# Patient Record
Sex: MALE | NOT HISPANIC OR LATINO | URBAN - METROPOLITAN AREA
[De-identification: names, ages, dates, MRNs, and addresses within clinical notes are randomized per-mention and may not be internally consistent; named-entity substitution may affect disease eponyms.]

---

## 2017-03-08 ENCOUNTER — OFFICE VISIT - HEALTHEAST (OUTPATIENT)
Dept: CARDIOLOGY | Facility: CLINIC | Age: 57
End: 2017-03-08

## 2017-03-08 DIAGNOSIS — I25.2 OLD INFERIOR WALL MYOCARDIAL INFARCTION: ICD-10-CM

## 2017-03-08 DIAGNOSIS — E78.5 DYSLIPIDEMIA, GOAL LDL BELOW 70: ICD-10-CM

## 2017-03-08 RX ORDER — ALBUTEROL SULFATE 90 UG/1
AEROSOL, METERED RESPIRATORY (INHALATION)
Status: SHIPPED | COMMUNITY
Start: 2017-01-07

## 2017-03-08 RX ORDER — AMLODIPINE BESYLATE 10 MG/1
10 TABLET ORAL
Status: SHIPPED | COMMUNITY
Start: 2016-12-20

## 2017-03-08 ASSESSMENT — MIFFLIN-ST. JEOR: SCORE: 1514.79

## 2017-03-17 ENCOUNTER — HOSPITAL ENCOUNTER (OUTPATIENT)
Dept: CARDIOLOGY | Facility: CLINIC | Age: 57
Discharge: HOME | End: 2017-03-17
Attending: INTERNAL MEDICINE

## 2017-03-17 DIAGNOSIS — I25.2 OLD INFERIOR WALL MYOCARDIAL INFARCTION: ICD-10-CM

## 2017-03-17 LAB
AORTIC ROOT: 3.3 CM
AORTIC VALVE MEAN VELOCITY: 114 CM/S
AV DIMENSIONLESS INDEX VTI: 0.7
AV MEAN GRADIENT: 6 MMHG
AV PEAK GRADIENT: 12.8 MMHG
AV VALVE AREA: 3.1 CM2
AV VELOCITY RATIO: 0.6
BSA FOR ECHO PROCEDURE: 1.86 M2
DOP CALC AO PEAK VEL: 179 CM/S
DOP CALC AO VTI: 38 CM
DOP CALC LVOT AREA: 4.15 CM2
DOP CALC LVOT DIAMETER: 2.3 CM
DOP CALC LVOT PEAK VEL: 112 CM/S
DOP CALC LVOT STROKE VOLUME: 116.7 CM3
DOP CALCLVOT PEAK VEL VTI: 28.1 CM
EJECTION FRACTION: 64 % (ref 55–75)
FRACTIONAL SHORTENING: 29.8 % (ref 28–44)
INTERVENTRICULAR SEPTUM IN END DIASTOLE: 1.5 CM (ref 0.6–1)
IVS/PW RATIO: 1.2
LA AREA 1: 18.8 CM2
LA AREA 2: 17.5 CM2
LEFT ATRIUM LENGTH: 4.98 CM
LEFT ATRIUM SIZE: 3.3 CM
LEFT ATRIUM VOLUME INDEX: 30.2 ML/M2
LEFT ATRIUM VOLUME: 56.2 CM3
LEFT VENTRICLE DIASTOLIC VOLUME INDEX: 65.1 CM3/M2 (ref 34–74)
LEFT VENTRICLE DIASTOLIC VOLUME: 121 CM3 (ref 62–150)
LEFT VENTRICLE MASS INDEX: 142.6 G/M2
LEFT VENTRICLE SYSTOLIC VOLUME INDEX: 23.1 CM3/M2 (ref 11–31)
LEFT VENTRICLE SYSTOLIC VOLUME: 43 CM3 (ref 21–61)
LEFT VENTRICULAR INTERNAL DIMENSION IN DIASTOLE: 4.7 CM (ref 4.2–5.8)
LEFT VENTRICULAR INTERNAL DIMENSION IN SYSTOLE: 3.3 CM (ref 2.5–4)
LEFT VENTRICULAR MASS: 265.2 G
LEFT VENTRICULAR OUTFLOW TRACT MEAN GRADIENT: 3 MMHG
LEFT VENTRICULAR OUTFLOW TRACT MEAN VELOCITY: 71.1 CM/S
LEFT VENTRICULAR OUTFLOW TRACT PEAK GRADIENT: 5 MMHG
LEFT VENTRICULAR POSTERIOR WALL IN END DIASTOLE: 1.3 CM (ref 0.6–1)
LV STROKE VOLUME INDEX: 62.7 ML/M2
MITRAL VALVE E/A RATIO: 1.2
MV AVERAGE E/E' RATIO: 16.4 CM/S
MV DECELERATION TIME: 204 MS
MV E'TISSUE VEL-LAT: 6.92 CM/S
MV E'TISSUE VEL-MED: 5.85 CM/S
MV LATERAL E/E' RATIO: 15.2
MV MEDIAL E/E' RATIO: 17.9
MV PEAK A VELOCITY: 89.5 CM/S
MV PEAK E VELOCITY: 105 CM/S
PR MAX PG: 4 MMHG
PR PEAK VELOCITY: 97.7 CM/S
TRICUSPID REGURGITATION PEAK PRESSURE GRADIENT: 23.4 MMHG
TRICUSPID VALVE PEAK REGURGITANT VELOCITY: 242 CM/S

## 2019-02-12 ENCOUNTER — COMMUNICATION - HEALTHEAST (OUTPATIENT)
Dept: ADMINISTRATIVE | Facility: CLINIC | Age: 59
End: 2019-02-12

## 2019-02-12 ENCOUNTER — AMBULATORY - HEALTHEAST (OUTPATIENT)
Dept: CARDIOLOGY | Facility: CLINIC | Age: 59
End: 2019-02-12

## 2019-02-12 DIAGNOSIS — I25.2 OLD INFERIOR WALL MYOCARDIAL INFARCTION: ICD-10-CM

## 2019-02-12 DIAGNOSIS — I25.10 CORONARY ATHEROSCLEROSIS: ICD-10-CM

## 2019-02-12 DIAGNOSIS — E78.5 DYSLIPIDEMIA, GOAL LDL BELOW 70: ICD-10-CM

## 2021-05-25 ENCOUNTER — RECORDS - HEALTHEAST (OUTPATIENT)
Dept: ADMINISTRATIVE | Facility: CLINIC | Age: 61
End: 2021-05-25

## 2021-05-27 ENCOUNTER — RECORDS - HEALTHEAST (OUTPATIENT)
Dept: ADMINISTRATIVE | Facility: CLINIC | Age: 61
End: 2021-05-27

## 2021-05-28 ENCOUNTER — RECORDS - HEALTHEAST (OUTPATIENT)
Dept: ADMINISTRATIVE | Facility: CLINIC | Age: 61
End: 2021-05-28

## 2021-05-29 ENCOUNTER — RECORDS - HEALTHEAST (OUTPATIENT)
Dept: ADMINISTRATIVE | Facility: CLINIC | Age: 61
End: 2021-05-29

## 2021-05-30 VITALS — BODY MASS INDEX: 24.4 KG/M2 | WEIGHT: 161 LBS | HEIGHT: 68 IN

## 2021-06-01 ENCOUNTER — RECORDS - HEALTHEAST (OUTPATIENT)
Dept: ADMINISTRATIVE | Facility: CLINIC | Age: 61
End: 2021-06-01

## 2021-06-09 ENCOUNTER — RECORDS - HEALTHEAST (OUTPATIENT)
Dept: ADMINISTRATIVE | Facility: CLINIC | Age: 61
End: 2021-06-09

## 2021-06-18 NOTE — LETTER
Letter by Frandy Llanos MD at      Author: Frandy Llanos MD Service: -- Author Type: --    Filed:  Encounter Date: 2/12/2019 Status: (Other)       Duke Barron  [address] [place] [address]      February 12, 2019      Dear Duke,    This letter is to remind you that you will be due for your follow up appointment with Dr. Frandy Llanos  . To help ensure you are in the best health possible, a regular follow-up with your cardiologist is essential.     Please call our Patient Scheduling Line at [phone] to schedule your appointment at your earliest convenience.  If you have recently scheduled an appointment, please disregard this letter.    We look forward to seeing you again. As always, we are available at the number  above for any questions or concerns you may have.      Sincerely,     The Physicians and Staff of [facility]

## 2021-06-25 NOTE — PROGRESS NOTES
Progress Notes by Frandy Llanos MD at 3/8/2017  9:50 AM     Author: Frandy Llanos MD Service: -- Author Type: Physician    Filed: 3/8/2017 10:29 AM Encounter Date: 3/8/2017 Status: Signed    : Frandy Llanos MD (Physician)           Click to link to [facility]     [facility] Clinic Note      Assessment:    1. Old inferior wall myocardial infarction  Echo Complete   2. Dyslipidemia, goal LDL below 70         Plan:    1.  Echocardiogram; we will call him with the results and advise him when to return for follow-up.    An After Visit Summary was printed and given to the patient.    Subjective:    Duke Barron returned for a planned biannual follow up visit.    He states he went back to smoking intermittently when he returned to [facility] 2 years ago to manage family affairs.  His mother is since  of breast cancer.  He does not experience any cardiac symptoms such as angina, unusual shortness of breath, lightheadedness, orthopnea, or lower extremity edema.    He gets his medications refilled through his primary care physician, Dr. Mykel Mcnair, at the [facility].    Past Medical History:    Patient Active Problem List   Diagnosis   ? Dyslipidemia, goal LDL below 70   ? Essential Hypertension   ? Coronary Artery Disease   ? Old inferior wall myocardial infarction       Past Medical History:   Diagnosis Date   ? Dyslipidemia, goal LDL below 70    ? Eye abnormalities     right eye; sees Dr. Kameron Mcconnell at [facility]   ? Old inferior wall myocardial infarction 3/27/2009       Past Surgical History:   Procedure Laterality Date   ? KNEE ARTHROSCOPY W/ MENISCECTOMY Right         reports that he has been smoking Cigarettes.  He has never used smokeless tobacco. He reports that he drinks alcohol. He reports that he does not use illicit drugs.    Family History   Problem Relation Age of Onset   ? CABG Father 84   ? No  "Medical Problems Brother    ? Breast cancer Mother    ? Sudden death Brother 45   ? Diabetes type II Brother    ? No Medical Problems Daughter    ? No Medical Problems Daughter        Outpatient Encounter Prescriptions as of 3/8/2017   Medication Sig Dispense Refill   ? amLODIPine (NORVASC) 10 MG tablet Take 10 mg by mouth.     ? aspirin 81 MG EC tablet 81 mg daily.     ? atorvastatin (LIPITOR) 20 MG tablet 20 mg daily.      ? lisinopril (PRINIVIL,ZESTRIL) 20 MG tablet Take 40 mg by mouth daily.     ? metoprolol (LOPRESSOR) 100 MG tablet 100 mg 2 (two) times a day.     ? multivitamin (MULTIVITAMIN) per tablet 1 tablet daily.     ? nitroglycerin (NITROSTAT) 0.4 MG SL tablet Place 0.4 mg under the tongue every 5 (five) minutes as needed for chest pain. PLACE 1 TABLET UNDER THE TONGUE EVERY 5 MINUTES UP TO 3 DOSES AS NEEDED FOR CHEST PAIN.     ? [DISCONTINUED] amLODIPine (NORVASC) 5 MG tablet 10 mg daily.      ? VENTOLIN HFA 90 mcg/actuation inhaler        No facility-administered encounter medications on file as of 3/8/2017.        Review of Systems:     General: WNL  Eyes: WNL  Ears/Nose/Throat: WNL  Lungs: Cough, Snoring  Heart: WNL  Stomach: WNL  Bladder: WNL  Muscle/Joints: Muscle Pain  Skin: WNL  Nervous System: WNL  Mental Health: WNL     Blood: WNL    Objective:     Visit Vitals   ? /60 (Patient Site: Left Arm, Patient Position: Sitting, Cuff Size: Adult Large)   ? Pulse 76   ? Resp 16   ? Ht 5' 8\" (1.727 m)   ? Wt 161 lb (73 kg)   ? BMI 24.48 kg/m2     Wt Readings from Last 5 Encounters:   03/08/17 161 lb (73 kg)   03/27/15 162 lb (73.5 kg)        Physical Exam:    GENERAL APPEARANCE: alert, no apparent distress  EYES: no scleral icterus  NECK: no carotid bruits or adenopathy, jugular venous pressure is less than 5 cm  CHEST: symmetric, the lungs are clear to auscultation  CARDIOVASCULAR: regular rhythm without murmur or gallop  EXTREMITIES: no cyanosis, clubbing or edema  SKIN: no " xanthelasma  NEUROLOGIC: normal gait and coordination  PSYCHIATRIC: mood and affect are normal    Cardiac Testing:    Echocardiogram: ordered    Imaging:    No results found.    Lab Results:    I personally reviewed the laboratory drawn at [facility] on December 20 with the patient through care everywhere.  His basic metabolic panel and CBC were normal.  The lipid profile was notable for an LDL cholesterol of 80.        Much or all of the text in this note was generated through the use of the Dragon Dictate voice-to-text software. Errors in spelling or words which seem out of context are unintentional. Sound alike errors, in particular, may have escaped editing.

## 2025-03-30 ENCOUNTER — APPOINTMENT (OUTPATIENT)
Dept: CT IMAGING | Facility: CLINIC | Age: 65
End: 2025-03-30
Attending: EMERGENCY MEDICINE
Payer: COMMERCIAL

## 2025-03-30 ENCOUNTER — APPOINTMENT (OUTPATIENT)
Dept: MRI IMAGING | Facility: CLINIC | Age: 65
End: 2025-03-30
Attending: EMERGENCY MEDICINE
Payer: COMMERCIAL

## 2025-03-30 ENCOUNTER — HOSPITAL ENCOUNTER (EMERGENCY)
Facility: CLINIC | Age: 65
Discharge: LEFT BEFORE DISCHARGE | End: 2025-03-31
Attending: EMERGENCY MEDICINE | Admitting: EMERGENCY MEDICINE
Payer: COMMERCIAL

## 2025-03-30 DIAGNOSIS — I63.9 ACUTE ISCHEMIC STROKE (H): ICD-10-CM

## 2025-03-30 DIAGNOSIS — G93.40 ACUTE ENCEPHALOPATHY: ICD-10-CM

## 2025-03-30 PROBLEM — E11.29 TYPE 2 DIABETES MELLITUS WITH DIABETIC MICROALBUMINURIA, WITH LONG-TERM CURRENT USE OF INSULIN (H): Status: ACTIVE | Noted: 2020-07-27

## 2025-03-30 PROBLEM — J41.0 SIMPLE CHRONIC BRONCHITIS (H): Status: ACTIVE | Noted: 2024-09-23

## 2025-03-30 PROBLEM — I73.9 PVD (PERIPHERAL VASCULAR DISEASE): Status: ACTIVE | Noted: 2023-03-02

## 2025-03-30 PROBLEM — Z79.4 TYPE 2 DIABETES MELLITUS WITH DIABETIC MICROALBUMINURIA, WITH LONG-TERM CURRENT USE OF INSULIN (H): Status: ACTIVE | Noted: 2020-07-27

## 2025-03-30 PROBLEM — N52.8 OTHER MALE ERECTILE DYSFUNCTION: Status: ACTIVE | Noted: 2024-06-19

## 2025-03-30 PROBLEM — R80.9 TYPE 2 DIABETES MELLITUS WITH DIABETIC MICROALBUMINURIA, WITH LONG-TERM CURRENT USE OF INSULIN (H): Status: ACTIVE | Noted: 2020-07-27

## 2025-03-30 PROBLEM — D12.6 ADENOMATOUS POLYP OF COLON: Status: ACTIVE | Noted: 2017-06-20

## 2025-03-30 LAB
ALBUMIN SERPL BCG-MCNC: 3.9 G/DL (ref 3.5–5.2)
ALP SERPL-CCNC: 74 U/L (ref 40–150)
ALT SERPL W P-5'-P-CCNC: 14 U/L (ref 0–70)
ANION GAP SERPL CALCULATED.3IONS-SCNC: 11 MMOL/L (ref 7–15)
APTT PPP: 27 SECONDS (ref 22–38)
AST SERPL W P-5'-P-CCNC: 18 U/L (ref 0–45)
BASOPHILS # BLD AUTO: 0.1 10E3/UL (ref 0–0.2)
BASOPHILS NFR BLD AUTO: 0 %
BILIRUB DIRECT SERPL-MCNC: 0.19 MG/DL (ref 0–0.3)
BILIRUB SERPL-MCNC: 0.4 MG/DL
BUN SERPL-MCNC: 10.1 MG/DL (ref 8–23)
CALCIUM SERPL-MCNC: 9.4 MG/DL (ref 8.8–10.4)
CHLORIDE SERPL-SCNC: 99 MMOL/L (ref 98–107)
CREAT BLD-MCNC: 1 MG/DL (ref 0.7–1.2)
CREAT SERPL-MCNC: 0.93 MG/DL (ref 0.67–1.17)
CRP SERPL-MCNC: 7.31 MG/L
EGFRCR SERPLBLD CKD-EPI 2021: >60 ML/MIN/1.73M2
EGFRCR SERPLBLD CKD-EPI 2021: >90 ML/MIN/1.73M2
EOSINOPHIL # BLD AUTO: 0 10E3/UL (ref 0–0.7)
EOSINOPHIL NFR BLD AUTO: 0 %
ERYTHROCYTE [DISTWIDTH] IN BLOOD BY AUTOMATED COUNT: 11.8 % (ref 10–15)
ERYTHROCYTE [SEDIMENTATION RATE] IN BLOOD BY WESTERGREN METHOD: 24 MM/HR (ref 0–20)
FLUAV RNA SPEC QL NAA+PROBE: NEGATIVE
FLUBV RNA RESP QL NAA+PROBE: NEGATIVE
GLUCOSE BLDC GLUCOMTR-MCNC: 127 MG/DL (ref 70–99)
GLUCOSE CSF-MCNC: 71 MG/DL (ref 40–70)
GLUCOSE SERPL-MCNC: 137 MG/DL (ref 70–99)
HCO3 SERPL-SCNC: 26 MMOL/L (ref 22–29)
HCT VFR BLD AUTO: 39.7 % (ref 40–53)
HGB BLD-MCNC: 13.8 G/DL (ref 13.3–17.7)
HOLD SPECIMEN: NORMAL
IMM GRANULOCYTES # BLD: 0 10E3/UL
IMM GRANULOCYTES NFR BLD: 0 %
INR PPP: 0.97 (ref 0.85–1.15)
LYMPHOCYTES # BLD AUTO: 2.5 10E3/UL (ref 0.8–5.3)
LYMPHOCYTES NFR BLD AUTO: 19 %
MCH RBC QN AUTO: 29.7 PG (ref 26.5–33)
MCHC RBC AUTO-ENTMCNC: 34.8 G/DL (ref 31.5–36.5)
MCV RBC AUTO: 86 FL (ref 78–100)
MONOCYTES # BLD AUTO: 1.1 10E3/UL (ref 0–1.3)
MONOCYTES NFR BLD AUTO: 9 %
NEUTROPHILS # BLD AUTO: 9.2 10E3/UL (ref 1.6–8.3)
NEUTROPHILS NFR BLD AUTO: 71 %
NRBC # BLD AUTO: 0 10E3/UL
NRBC BLD AUTO-RTO: 0 /100
PLATELET # BLD AUTO: 323 10E3/UL (ref 150–450)
POTASSIUM SERPL-SCNC: 3.7 MMOL/L (ref 3.4–5.3)
PROT CSF-MCNC: 50.2 MG/DL (ref 15–45)
PROT SERPL-MCNC: 7.1 G/DL (ref 6.4–8.3)
RBC # BLD AUTO: 4.64 10E6/UL (ref 4.4–5.9)
RSV RNA SPEC NAA+PROBE: NEGATIVE
SARS-COV-2 RNA RESP QL NAA+PROBE: NEGATIVE
SODIUM SERPL-SCNC: 136 MMOL/L (ref 135–145)
WBC # BLD AUTO: 12.9 10E3/UL (ref 4–11)

## 2025-03-30 PROCEDURE — 89050 BODY FLUID CELL COUNT: CPT | Performed by: EMERGENCY MEDICINE

## 2025-03-30 PROCEDURE — 87529 HSV DNA AMP PROBE: CPT | Performed by: EMERGENCY MEDICINE

## 2025-03-30 PROCEDURE — 99285 EMERGENCY DEPT VISIT HI MDM: CPT | Mod: 25

## 2025-03-30 PROCEDURE — 82962 GLUCOSE BLOOD TEST: CPT

## 2025-03-30 PROCEDURE — 80061 LIPID PANEL: CPT | Performed by: INTERNAL MEDICINE

## 2025-03-30 PROCEDURE — 99204 OFFICE O/P NEW MOD 45 MIN: CPT | Performed by: INTERNAL MEDICINE

## 2025-03-30 PROCEDURE — 36415 COLL VENOUS BLD VENIPUNCTURE: CPT | Performed by: EMERGENCY MEDICINE

## 2025-03-30 PROCEDURE — 82945 GLUCOSE OTHER FLUID: CPT | Performed by: EMERGENCY MEDICINE

## 2025-03-30 PROCEDURE — 87798 DETECT AGENT NOS DNA AMP: CPT | Performed by: EMERGENCY MEDICINE

## 2025-03-30 PROCEDURE — 70553 MRI BRAIN STEM W/O & W/DYE: CPT

## 2025-03-30 PROCEDURE — 85652 RBC SED RATE AUTOMATED: CPT | Performed by: EMERGENCY MEDICINE

## 2025-03-30 PROCEDURE — 82248 BILIRUBIN DIRECT: CPT | Performed by: EMERGENCY MEDICINE

## 2025-03-30 PROCEDURE — 255N000002 HC RX 255 OP 636: Performed by: EMERGENCY MEDICINE

## 2025-03-30 PROCEDURE — 250N000011 HC RX IP 250 OP 636: Performed by: EMERGENCY MEDICINE

## 2025-03-30 PROCEDURE — 87205 SMEAR GRAM STAIN: CPT | Performed by: EMERGENCY MEDICINE

## 2025-03-30 PROCEDURE — 85025 COMPLETE CBC W/AUTO DIFF WBC: CPT | Performed by: EMERGENCY MEDICINE

## 2025-03-30 PROCEDURE — 86255 FLUORESCENT ANTIBODY SCREEN: CPT | Performed by: EMERGENCY MEDICINE

## 2025-03-30 PROCEDURE — 85018 HEMOGLOBIN: CPT | Performed by: EMERGENCY MEDICINE

## 2025-03-30 PROCEDURE — 80048 BASIC METABOLIC PNL TOTAL CA: CPT | Performed by: EMERGENCY MEDICINE

## 2025-03-30 PROCEDURE — 82077 ASSAY SPEC XCP UR&BREATH IA: CPT | Performed by: EMERGENCY MEDICINE

## 2025-03-30 PROCEDURE — 80051 ELECTROLYTE PANEL: CPT | Performed by: EMERGENCY MEDICINE

## 2025-03-30 PROCEDURE — 82040 ASSAY OF SERUM ALBUMIN: CPT | Performed by: EMERGENCY MEDICINE

## 2025-03-30 PROCEDURE — 86140 C-REACTIVE PROTEIN: CPT | Performed by: EMERGENCY MEDICINE

## 2025-03-30 PROCEDURE — 83036 HEMOGLOBIN GLYCOSYLATED A1C: CPT | Performed by: INTERNAL MEDICINE

## 2025-03-30 PROCEDURE — 62270 DX LMBR SPI PNXR: CPT

## 2025-03-30 PROCEDURE — 93005 ELECTROCARDIOGRAM TRACING: CPT | Performed by: EMERGENCY MEDICINE

## 2025-03-30 PROCEDURE — 99291 CRITICAL CARE FIRST HOUR: CPT | Mod: 25

## 2025-03-30 PROCEDURE — 85730 THROMBOPLASTIN TIME PARTIAL: CPT | Performed by: EMERGENCY MEDICINE

## 2025-03-30 PROCEDURE — 86341 ISLET CELL ANTIBODY: CPT | Performed by: EMERGENCY MEDICINE

## 2025-03-30 PROCEDURE — 87483 CNS DNA AMP PROBE TYPE 12-25: CPT | Performed by: EMERGENCY MEDICINE

## 2025-03-30 PROCEDURE — 87070 CULTURE OTHR SPECIMN AEROBIC: CPT | Performed by: EMERGENCY MEDICINE

## 2025-03-30 PROCEDURE — 82565 ASSAY OF CREATININE: CPT

## 2025-03-30 PROCEDURE — A9585 GADOBUTROL INJECTION: HCPCS | Performed by: EMERGENCY MEDICINE

## 2025-03-30 PROCEDURE — 96375 TX/PRO/DX INJ NEW DRUG ADDON: CPT

## 2025-03-30 PROCEDURE — 87637 SARSCOV2&INF A&B&RSV AMP PRB: CPT | Performed by: EMERGENCY MEDICINE

## 2025-03-30 PROCEDURE — 85610 PROTHROMBIN TIME: CPT | Performed by: EMERGENCY MEDICINE

## 2025-03-30 PROCEDURE — 84157 ASSAY OF PROTEIN OTHER: CPT | Performed by: EMERGENCY MEDICINE

## 2025-03-30 PROCEDURE — 70496 CT ANGIOGRAPHY HEAD: CPT

## 2025-03-30 RX ORDER — VARDENAFIL HYDROCHLORIDE 20 MG/1
20 TABLET ORAL DAILY PRN
COMMUNITY

## 2025-03-30 RX ORDER — GADOBUTROL 604.72 MG/ML
7.5 INJECTION INTRAVENOUS ONCE
Status: COMPLETED | OUTPATIENT
Start: 2025-03-30 | End: 2025-03-30

## 2025-03-30 RX ORDER — CHLORAL HYDRATE 500 MG
1 CAPSULE ORAL 2 TIMES DAILY
COMMUNITY

## 2025-03-30 RX ORDER — LORAZEPAM 2 MG/ML
1 INJECTION INTRAMUSCULAR
Status: COMPLETED | OUTPATIENT
Start: 2025-03-30 | End: 2025-03-30

## 2025-03-30 RX ORDER — BUDESONIDE AND FORMOTEROL FUMARATE DIHYDRATE 80; 4.5 UG/1; UG/1
1-2 AEROSOL RESPIRATORY (INHALATION) 4 TIMES DAILY PRN
COMMUNITY

## 2025-03-30 RX ORDER — ALBUTEROL SULFATE 90 UG/1
1-2 INHALANT RESPIRATORY (INHALATION) EVERY 6 HOURS PRN
Status: DISCONTINUED | OUTPATIENT
Start: 2025-03-30 | End: 2025-03-31 | Stop reason: HOSPADM

## 2025-03-30 RX ORDER — FLUTICASONE FUROATE AND VILANTEROL 100; 25 UG/1; UG/1
1 POWDER RESPIRATORY (INHALATION) DAILY PRN
Status: DISCONTINUED | OUTPATIENT
Start: 2025-03-30 | End: 2025-03-31 | Stop reason: HOSPADM

## 2025-03-30 RX ORDER — NICOTINE POLACRILEX 4 MG
15-30 LOZENGE BUCCAL
Status: DISCONTINUED | OUTPATIENT
Start: 2025-03-30 | End: 2025-03-31 | Stop reason: HOSPADM

## 2025-03-30 RX ORDER — DEXTROSE MONOHYDRATE 25 G/50ML
25-50 INJECTION, SOLUTION INTRAVENOUS
Status: DISCONTINUED | OUTPATIENT
Start: 2025-03-30 | End: 2025-03-31 | Stop reason: HOSPADM

## 2025-03-30 RX ORDER — IOPAMIDOL 755 MG/ML
75 INJECTION, SOLUTION INTRAVASCULAR ONCE
Status: COMPLETED | OUTPATIENT
Start: 2025-03-30 | End: 2025-03-30

## 2025-03-30 RX ORDER — ASPIRIN 81 MG/1
81 TABLET, CHEWABLE ORAL DAILY
Status: DISCONTINUED | OUTPATIENT
Start: 2025-03-31 | End: 2025-03-31 | Stop reason: HOSPADM

## 2025-03-30 RX ORDER — METOPROLOL SUCCINATE 100 MG/1
100 TABLET, EXTENDED RELEASE ORAL 2 TIMES DAILY
COMMUNITY

## 2025-03-30 RX ORDER — NITROGLYCERIN 0.4 MG/1
0.4 TABLET SUBLINGUAL EVERY 5 MIN PRN
Status: DISCONTINUED | OUTPATIENT
Start: 2025-03-30 | End: 2025-03-31 | Stop reason: HOSPADM

## 2025-03-30 RX ORDER — ATORVASTATIN CALCIUM 40 MG/1
40 TABLET, FILM COATED ORAL DAILY
Status: DISCONTINUED | OUTPATIENT
Start: 2025-03-31 | End: 2025-03-31 | Stop reason: HOSPADM

## 2025-03-30 RX ADMIN — IOPAMIDOL 75 ML: 755 INJECTION, SOLUTION INTRAVENOUS at 19:25

## 2025-03-30 RX ADMIN — GADOBUTROL 7.5 ML: 604.72 INJECTION INTRAVENOUS at 21:32

## 2025-03-30 RX ADMIN — LORAZEPAM 1 MG: 2 INJECTION INTRAMUSCULAR; INTRAVENOUS at 22:44

## 2025-03-30 ASSESSMENT — ACTIVITIES OF DAILY LIVING (ADL)
ADLS_ACUITY_SCORE: 41

## 2025-03-30 ASSESSMENT — COLUMBIA-SUICIDE SEVERITY RATING SCALE - C-SSRS
6. HAVE YOU EVER DONE ANYTHING, STARTED TO DO ANYTHING, OR PREPARED TO DO ANYTHING TO END YOUR LIFE?: NO
2. HAVE YOU ACTUALLY HAD ANY THOUGHTS OF KILLING YOURSELF IN THE PAST MONTH?: NO
1. IN THE PAST MONTH, HAVE YOU WISHED YOU WERE DEAD OR WISHED YOU COULD GO TO SLEEP AND NOT WAKE UP?: NO

## 2025-03-30 NOTE — ED TRIAGE NOTES
Pt brought in by wife for confusion and headache. She reports that yesterday he had a severe headache which is not normal for him so she thought he was starting with a cold so laid down. This morning he came downstairs but didn't say anything to his wife and went back upstairs and slept until 530p and came down and was confused about the , time, wife's name, and mixing penny words with english. Having word finding difficulty. Still having a mild headache. Dr. Gross meant pt in the room for a neuro eval.      Triage Assessment (Adult)       Row Name 03/30/25 4183          Triage Assessment    Airway WDL WDL        Respiratory WDL    Respiratory WDL WDL        Skin Circulation/Temperature WDL    Skin Circulation/Temperature WDL WDL        Cardiac WDL    Cardiac WDL WDL        Peripheral/Neurovascular WDL    Peripheral Neurovascular WDL WDL        Cognitive/Neuro/Behavioral WDL    Cognitive/Neuro/Behavioral WDL speech     Speech garbled

## 2025-03-30 NOTE — ED PROVIDER NOTES
EMERGENCY DEPARTMENT ENCOUNTER      NAME: Duke Barron  AGE: 64 year old male  YOB: 1960  MRN: [id number]  EVALUATION DATE & TIME: No admission date for patient encounter.    PCP: No Ref-Primary, Physician    ED PROVIDER: Elana Gross DO      Chief Complaint   Patient presents with    Altered Mental Status    Headache         FINAL IMPRESSION:  1. Acute ischemic stroke (H)    2. Acute encephalopathy          ED COURSE & MEDICAL DECISION MAKING:    Pertinent Labs & Imaging studies reviewed. (See chart for details)  6:52 PM I met the patient and performed my initial interview and exam.  7:32 PM I reviewed patients head CT, no obvious bleed or space occupying lesion.  8:09 PM I rechecked and updated patient. Continues with his confusion/word finding difficulties  10:08 PM I updated patient and family.   10:17 PM I spoke with Dr. Patton from Stroke Neurology. The area of his stroke does not explain his symptoms. Plan to transfer to either [facility] or the [facility], concerns he may need Neuro IR for a DSA and would not have that at [facility], but could potentially go to [facility] if no beds available.  Okay to cover with acyclovir for now until HSV/VZV come back.  No need to cover for meningitis unless patient spikes a fever then would give empiric coverage.  10:25 PM Updated patient and family on next steps. Per SOC, no beds in system tonight  10:52 PM I performed Lumbar puncture procedure.   11:35 PM Per Nursing, SOC reports may be a bed at [facility] tomorrow.  Going to keep him in the queue  11:38 PM Patient's wife pulled me aside asking about necessity of admission.  Reports concerns about being able to afford this stay.  I did discuss risk of decompensation, worsening condition, death.  She expresses understanding and willing to stay    64 year old male presents to the Emergency Department for evaluation of altered mental status, word finding difficulties.  Patient himself is a poor historian  as he has a difficult time coming up with words.  Per wife report a headache yesterday afternoon around 4 PM.  After that went and laid down.  She admits she did not really talk to him for the rest of the night.  She also did not talk to him much today.  Just figured he had some sort of a cold.  He does not normally get headaches.  Around an hour and a half prior to arrival he was going to make coffee but seemed confused, reported turned on the faucet but seemed confused because the water was not working.  Could not name her name.  Had other word finding difficulties.  Given it has been greater than 24 hours since the patient's last known normal stroke code was not initiated this time.  No other focal deficits.  Patient does have difficulty following my instructions on exam.  No nuchal rigidity or fever to suggest meningitis.  Will obtain a stat head CT to evaluate for signs of hemorrhage, space-occupying lesion, LVO.  I did go over to the scanner with the patient no obvious intracranial hemorrhage or aneurysm or large vessel occlusion on my initial read.  POCT glucose unremarkable.    CT imaging unremarkable.  Mild leukocytosis.  Afebrile here.  MRI does show a small acute to subacute stroke in the left occipital region.  I discussed with stroke neurology.  His symptoms do not explain the findings.  Recommend LP, encephalitis testing, reasonable to cover with acyclovir until cultures come back.  Do not need antibiotics for meningitis unless he develops a fever.  Recommends transfer to either the [facility] or [facility], EEG.  LP performed here, results pending.  No beds available tonight, may get up bed at [facility] tomorrow.  Will consult the hospitalist to help with management.    At the conclusion of the encounter I discussed the results of all of the tests and the disposition. The questions were answered. The patient or family acknowledged understanding and was agreeable with the care plan.     Medical Decision  Making  Pending transfer    MIPS (CTPE, Dental pain, Wilkinson, Sinusitis, Asthma/COPD, Head Trauma): Not Applicable    SEPSIS: None      Critical Care     Performed by: Dr Patricia Gross  Authorized by: Dr Patricia Gross  Total critical care time: 45 minutes  Critical care was necessary to treat or prevent imminent or life-threatening deterioration of the following conditions: Acute stroke, encephalopathy   Critical care was time spent personally by me on the following activities: development of treatment plan with patient or surrogate, discussions with consultants, examination of patient, evaluation of patient's response to treatment, obtaining history from patient or surrogate, ordering and performing treatments and interventions, ordering and review of laboratory studies, ordering and review of radiographic studies, re-evaluation of patient's condition and monitoring for potential decompensation.  Critical care time was exclusive of separately billable procedures and treating other patients.     MEDICATIONS GIVEN IN THE EMERGENCY:  Medications   LORazepam (ATIVAN) injection 1 mg (has no administration in time range)   iopamidol (ISOVUE-370) solution 75 mL (75 mLs Intravenous $Given 3/30/25 1925)   gadobutrol (GADAVIST) injection 7.5 mL (7.5 mLs Intravenous $Given 3/30/25 2132)       NEW PRESCRIPTIONS STARTED AT TODAY'S ER VISIT  New Prescriptions    No medications on file          =================================================================    HPI    Patient information was obtained from: Patient's Wife     Use of : N/A         Duke Wallcosme Barron is a 64 year old male with a pertinent history of PVD, T2DM, CAD, HTN, HLD, who presents to this ED by walk in for evaluation of altered mental status.     Per wife, she reports that the patient developed a headache in the late afternoon yesterday. He does not normally get headaches. She thought he was getting sick, as he spent the rest of the evening sleeping.  "She did not speak/interact with him until 5:30 pm this evening. At this point he was \"out of it.\" He was confused when he turned the faucet on, as he said the water was broken (it was not). And while making coffee he struggled to operate the coffee machine. She said he is mixing his Syn[employer] vocabulary with his english. He was not able to state the year or month when his wife asked. Yesterday around 4 pm was his last known normal. Of note, patient took a baby aspirin prior to arrival.     Pt mentioned he may have a little cough, but denies having any pain. He is a smoker but no alcohol or drug use. He takes amlodipine, baby aspirin, metformin daily. No recorded fevers or recent falls. No recent travel.    REVIEW OF SYSTEMS   Per HPI    PAST MEDICAL HISTORY:  No past medical history on file.    PAST SURGICAL HISTORY:  Past Surgical History:   Procedure Laterality Date    ARTHROSCOPY KNEE WITH MENISCECTOMY Right 2005           CURRENT MEDICATIONS:    amLODIPine (NORVASC) 10 MG tablet  aspirin (ASA) 81 MG chewable tablet  atorvastatin (LIPITOR) 20 MG tablet  budesonide-formoterol (SYMBICORT/BREYNA) 80-4.5 MCG/ACT Inhaler  fish oil-omega-3 fatty acids 1000 MG capsule  lisinopril (ZESTRIL) 40 MG tablet  metFORMIN (GLUCOPHAGE) 1000 MG tablet  metoprolol succinate ER (TOPROL XL) 100 MG 24 hr tablet  multivitamin (MULTIVITAMIN) per tablet  nitroglycerin (NITROSTAT) 0.4 MG SL tablet  vardenafil (LEVITRA) 20 MG tablet  VENTOLIN HFA 90 mcg/actuation inhaler         ALLERGIES:  No Known Allergies    FAMILY HISTORY:  Family History   Problem Relation Age of Onset    CABG Father 84.00    No Known Problems Brother     Breast Cancer Mother     Sudden Death Brother 45.00    Diabetes Type 2  Brother     No Known Problems Daughter     No Known Problems Daughter        SOCIAL HISTORY:   Social History     Socioeconomic History    Marital status:     Number of children: 2   Tobacco Use    Smoking status: Some Days     Types: " Cigarettes    Smokeless tobacco: Never   Substance and Sexual Activity    Alcohol use: Yes     Comment: Alcoholic Drinks/day: one drink a day in [place]    Drug use: No    Sexual activity: Yes     Partners: Female   Social History Narrative    His wife, Tati, is a research coordinator at [employer]    Two biological daughters and two step daughters.    Does yoga for exercise.    Applied for SS disability due to right eye visual impairment.       VITALS:  BP (!) 177/77 (BP Location: Left arm, Patient Position: Semi-Ferreira's, Cuff Size: Adult Regular)   Pulse 62   Temp 98.5  F (36.9  C) (Oral)   Resp 28   Wt 73.5 kg (162 lb)   SpO2 96%   BMI 24.63 kg/m      PHYSICAL EXAM    Physical Exam  Constitutional:       General: He is not in acute distress.     Appearance: He is not toxic-appearing.      Comments: Appears confused, difficulty following directions   HENT:      Head: Normocephalic and atraumatic.      Mouth/Throat:      Pharynx: Oropharynx is clear.   Eyes:      Pupils: Pupils are equal, round, and reactive to light.   Cardiovascular:      Rate and Rhythm: Normal rate and regular rhythm.      Pulses: Normal pulses.      Heart sounds: Normal heart sounds.   Pulmonary:      Effort: Pulmonary effort is normal.      Breath sounds: Normal breath sounds.   Abdominal:      General: Abdomen is flat. Bowel sounds are normal.      Palpations: Abdomen is soft.      Tenderness: There is no abdominal tenderness.   Musculoskeletal:         General: Normal range of motion.   Skin:     General: Skin is warm and dry.      Capillary Refill: Capillary refill takes less than 2 seconds.   Neurological:      Mental Status: He is alert.      Comments: See NIHSS           National Institutes of Health Stroke Scale  Exam Interval: Baseline   Score    Level of consciousness: (0)   Alert, keenly responsive    LOC questions: (2)   Answers neither question correctly    LOC commands: (1)   Performs one task correctly     Best gaze: (0)   Normal    Visual: (0)   No visual loss    Facial palsy: (0)   Normal symmetrical movements    Motor arm (left): (0)   No drift    Motor arm (right): (0)   No drift    Motor leg (left): (0)   No drift    Motor leg (right): (0)   No drift    Limb ataxia: (0)   Absent    Sensory: (0)   Normal- no sensory loss    Best language: (1)   Mild to moderate aphasia    Dysarthria: (0)   Normal    Extinction and inattention: (0)   No abnormality        Total Score:  4      LAB:  All pertinent labs reviewed and interpreted.  Labs Ordered and Resulted from Time of ED Arrival to Time of ED Departure   BASIC METABOLIC PANEL - Abnormal       Result Value    Sodium 136      Potassium 3.7      Chloride 99      Carbon Dioxide (CO2) 26      Anion Gap 11      Urea Nitrogen 10.1      Creatinine 0.93      GFR Estimate >90      Calcium 9.4      Glucose 137 (*)    GLUCOSE BY METER - Abnormal    GLUCOSE BY METER POCT 127 (*)    CBC WITH PLATELETS AND DIFFERENTIAL - Abnormal    WBC Count 12.9 (*)     RBC Count 4.64      Hemoglobin 13.8      Hematocrit 39.7 (*)     MCV 86      MCH 29.7      MCHC 34.8      RDW 11.8      Platelet Count 323      % Neutrophils 71      % Lymphocytes 19      % Monocytes 9      % Eosinophils 0      % Basophils 0      % Immature Granulocytes 0      NRBCs per 100 WBC 0      Absolute Neutrophils 9.2 (*)     Absolute Lymphocytes 2.5      Absolute Monocytes 1.1      Absolute Eosinophils 0.0      Absolute Basophils 0.1      Absolute Immature Granulocytes 0.0      Absolute NRBCs 0.0     CRP INFLAMMATION - Abnormal    CRP Inflammation 7.31 (*)    INR - Normal    INR 0.97     PARTIAL THROMBOPLASTIN TIME - Normal    aPTT 27     ISTAT CREATININE POCT - Normal    Creatinine POCT 1.0      GFR, ESTIMATED POCT >60     INFLUENZA A/B, RSV AND SARS-COV2 PCR - Normal    Influenza A PCR Negative      Influenza B PCR Negative      RSV PCR Negative      SARS CoV2 PCR Negative     HEPATIC FUNCTION PANEL - Normal     Protein Total 7.1      Albumin 3.9      Bilirubin Total 0.4      Alkaline Phosphatase 74      AST 18      ALT 14      Bilirubin Direct 0.19     GLUCOSE MONITOR NURSING POCT   ERYTHROCYTE SEDIMENTATION RATE AUTO   GLUCOSE CSF   PROTEIN TOTAL CSF   ENCEPHALOPATHY, AUTOIMMUNE EVALUATION CASCADE, SPINAL FLUID, ADULT   ISTAT CREATININE POCT   AEROBIC BACTERIAL CULTURE ROUTINE   MENINGITIS/ENCEPHALITIS PANEL QUAL PCR CSF   HERPES SIMPLEX VIRUS 1&2 PCR   VARICELLA ZOSTER VIRUS BY PCR   CELL COUNT WITH DIFFERENTIAL CSF       RADIOLOGY:  Reviewed all pertinent imaging. Please see official radiology report.  MR Brain w/o & w Contrast   Final Result   IMPRESSION:   1.  Small punctate foci of acute to subacute infarction in the left occipital lobe.   2.  No superimposed acute intracranial process or abnormal enhancement.   3.  Mild age-related changes as above.         CTA Head Neck with Contrast   Final Result   IMPRESSION:    HEAD CT:   Mild age-related changes as above with no acute intracranial abnormality.      HEAD CTA:   1.  Moderate to severe narrowing in the distal right P2 segment with mild narrowing in the right P1/P2 junction.   2.  Moderate to severe narrowing in the proximal left V4 segment.   3.  No aneurysm and no high flow vascular malformation.      NECK CTA:   1.  No definite high-grade stenosis of the bilateral common or internal carotid arteries. However, motion markedly limits evaluation of the left carotid bifurcation and proximal left internal carotid artery.   2.  No high-grade vertebral artery stenosis.   3.  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             EKG:    Performed at: 30-Mar-2025 at 19:44    Impression: Sinus rhythm with 1st degree A-V block, voltage criteria for left ventricular hypertrophy. Abnormal ECG.     Rate: 65 bpm  Rhythm: Sinus  Axis: 58  UT Interval: 220 ms  QRS Interval: 114 ms  QTc  Interval: 438 ms  ST Changes: IN interval has increased.  Comparison: Compared with EKG from 03-Apr-2009    I have independently reviewed and interpreted the EKG(s) documented above.    Procedures:  PROCEDURE: Lumbar Puncture   INDICATION: confusion   PROCEDURE PROVIDER: Dr Patricia Gross   CONSENT: Risks, benefits and alternatives were discussed with and Written consent was obtained from Spouse.   TIME OUT: Universal protocol was followed. TIME OUT conducted just prior to starting procedure confirmed patient identity, site/side, procedure, patient position, and availability of correct equipment. Yes   NOTE: Patient was placed in a right lateral decubitus position and the iliac crests were palpated. The L4-5 interspace was identified. The area was prepped with chlorhexidine and draped in the usual sterile fashion. 7 mLs of 1% Lidocaine without epinephrine was then injected subcutaneously to provide local anesthesia.  A standard 20 gauge spinal needle was used to gain access to the subarachnoid space at the L4-5 interspace with stylet in place.  The fluid was clear. A total of 6 mL of CSF was obtained and divided equally into four collection tubes. The stylet was replaced, the needle was removed and a Band-Aid was applied.      COMPLICATIONS: Patient tolerated procedure well, without complication          I, Megan Otoole, am serving as a scribe to document services personally performed by Dr. Elana Gross based on my observation and the provider's statements to me. Elana LUEVANO DO attest that Megan Otoole is acting in a scribe capacity, has observed my performance of the services and has documented them in accordance with my direction.    Elana Gross DO  Emergency Medicine  [facility] EMERGENCY ROOM  [address] [id number] [phone]  Dept: [phone]       Elana Gross DO  03/31/25 0005

## 2025-03-31 VITALS
OXYGEN SATURATION: 96 % | TEMPERATURE: 98.5 F | BODY MASS INDEX: 24.63 KG/M2 | DIASTOLIC BLOOD PRESSURE: 72 MMHG | WEIGHT: 162 LBS | SYSTOLIC BLOOD PRESSURE: 163 MMHG | RESPIRATION RATE: 18 BRPM | HEART RATE: 76 BPM

## 2025-03-31 PROBLEM — G93.40 ACUTE ENCEPHALOPATHY: Status: ACTIVE | Noted: 2025-03-31

## 2025-03-31 PROBLEM — I63.9 ACUTE ISCHEMIC STROKE (H): Status: ACTIVE | Noted: 2025-03-31

## 2025-03-31 LAB
APPEARANCE CSF: CLEAR
ATRIAL RATE - MUSE: 65 BPM
C GATTII+NEOFOR DNA CSF QL NAA+NON-PROBE: NEGATIVE
CHOLEST SERPL-MCNC: 176 MG/DL
CMV DNA CSF QL NAA+NON-PROBE: NEGATIVE
COLOR CSF: COLORLESS
DIASTOLIC BLOOD PRESSURE - MUSE: NORMAL MMHG
E COLI K1 AG CSF QL: NEGATIVE
EST. AVERAGE GLUCOSE BLD GHB EST-MCNC: 174 MG/DL
ETHANOL SERPL-MCNC: <0.01 G/DL
EV RNA SPEC QL NAA+PROBE: NEGATIVE
GLUCOSE BLDC GLUCOMTR-MCNC: 102 MG/DL (ref 70–99)
GLUCOSE BLDC GLUCOMTR-MCNC: 104 MG/DL (ref 70–99)
GLUCOSE BLDC GLUCOMTR-MCNC: 119 MG/DL (ref 70–99)
GP B STREP DNA CSF QL NAA+NON-PROBE: NEGATIVE
HAEM INFLU DNA CSF QL NAA+NON-PROBE: NEGATIVE
HBA1C MFR BLD: 7.7 %
HDLC SERPL-MCNC: 42 MG/DL
HHV6 DNA CSF QL NAA+NON-PROBE: NEGATIVE
HSV1 DNA CSF QL NAA+NON-PROBE: NEGATIVE
HSV1 DNA CSF QL NAA+PROBE: NOT DETECTED
HSV2 DNA CSF QL NAA+NON-PROBE: NEGATIVE
HSV2 DNA CSF QL NAA+PROBE: NOT DETECTED
INTERPRETATION ECG - MUSE: NORMAL
L MONOCYTOG DNA CSF QL NAA+NON-PROBE: NEGATIVE
LDLC SERPL CALC-MCNC: 91 MG/DL
LVEF ECHO: NORMAL
N MEN DNA CSF QL NAA+NON-PROBE: NEGATIVE
NONHDLC SERPL-MCNC: 134 MG/DL
P AXIS - MUSE: 79 DEGREES
PARECHOVIRUS A RNA CSF QL NAA+NON-PROBE: NEGATIVE
PR INTERVAL - MUSE: 220 MS
QRS DURATION - MUSE: 114 MS
QT - MUSE: 422 MS
QTC - MUSE: 438 MS
R AXIS - MUSE: 58 DEGREES
RBC # CSF MANUAL: 0 /UL (ref 0–2)
S PNEUM DNA CSF QL NAA+NON-PROBE: NEGATIVE
SPECIMEN TYPE: NORMAL
SYSTOLIC BLOOD PRESSURE - MUSE: NORMAL MMHG
T AXIS - MUSE: 57 DEGREES
TRIGL SERPL-MCNC: 217 MG/DL
TUBE # CSF: 4
VENTRICULAR RATE- MUSE: 65 BPM
VZV DNA CSF QL NAA+NON-PROBE: NEGATIVE
VZV DNA SPEC QL NAA+PROBE: NOT DETECTED
WBC # CSF MANUAL: 3 /UL (ref 0–5)

## 2025-03-31 PROCEDURE — 250N000013 HC RX MED GY IP 250 OP 250 PS 637: Performed by: INTERNAL MEDICINE

## 2025-03-31 PROCEDURE — 82962 GLUCOSE BLOOD TEST: CPT

## 2025-03-31 PROCEDURE — 96365 THER/PROPH/DIAG IV INF INIT: CPT

## 2025-03-31 PROCEDURE — 250N000011 HC RX IP 250 OP 636: Performed by: EMERGENCY MEDICINE

## 2025-03-31 PROCEDURE — 258N000003 HC RX IP 258 OP 636: Performed by: EMERGENCY MEDICINE

## 2025-03-31 PROCEDURE — 99214 OFFICE O/P EST MOD 30 MIN: CPT | Performed by: INTERNAL MEDICINE

## 2025-03-31 PROCEDURE — 96366 THER/PROPH/DIAG IV INF ADDON: CPT

## 2025-03-31 PROCEDURE — 99207 PR NO BILLABLE SERVICE THIS VISIT: CPT | Performed by: NURSE PRACTITIONER

## 2025-03-31 RX ADMIN — ACYCLOVIR SODIUM 750 MG: 50 INJECTION, SOLUTION INTRAVENOUS at 00:26

## 2025-03-31 RX ADMIN — METFORMIN HYDROCHLORIDE 1000 MG: 500 TABLET, FILM COATED ORAL at 07:40

## 2025-03-31 RX ADMIN — ATORVASTATIN CALCIUM 40 MG: 40 TABLET, FILM COATED ORAL at 07:40

## 2025-03-31 RX ADMIN — ACYCLOVIR SODIUM 750 MG: 50 INJECTION, SOLUTION INTRAVENOUS at 07:40

## 2025-03-31 RX ADMIN — ASPIRIN 81 MG CHEWABLE TABLET 81 MG: 81 TABLET CHEWABLE at 07:40

## 2025-03-31 ASSESSMENT — ACTIVITIES OF DAILY LIVING (ADL)
ADLS_ACUITY_SCORE: 41

## 2025-03-31 NOTE — CONSULTS
"Brief Stroke Note:     Per chart review, 64-year-old male who presented with headache, altered mental status, and was found to have punctate left occipital infarct that would not explain presenting symptoms.  Overnight stroke neurologist recommended transfer to [facility] or [facility] for workup of possible vasculitis.  Spoke with Dr. Burger this morning, patient/spouse would like to decline admission.  Communicated that if he discharges, it would be AGAINST MEDICAL ADVICE from a stroke neurology standpoint.  Scheduled to see him in the ED for telestroke consultation at 12 PM, he left prior to our appointment.    CIPRIANO Salinas, [facility]  Neurology  03/31/2025 12:29 PM  To page stroke neurology after hours or on a subsequent day, click here: AMCOM  Choose \"On Call\" tab at top, then search dropdown box for \"Neurology Adult\" & press Enter, look for Neuro ICU/Stroke       "

## 2025-03-31 NOTE — ED NOTES
Pt on cardiac monitoring, sinus rhythm.  Wife at bedside, plans to go home to sleep.  Verified contact phone number.  Wife aware of plan of care and will be updated with any new information or change in status.

## 2025-03-31 NOTE — ED NOTES
"[facility]    Stroke Telephone Note    I was called by Elana Gross on 03/30/25 regarding patient Duke Barron. The patient is a 64 year old male h/o CAD. Yesterday 4pm had headache, didn't see him today, at 5:30 was trying to make coffee/turn on faucet but without success, difficulty naming things (>24h of symptoms). Aphasia + difficulty following commands in ER.    Vitals  BP: (!) 177/77   Pulse: 62   Resp: 28   Temp: 98.5  F (36.9  C)   Weight: 73.5 kg (162 lb)    Imaging Findings  CT head: HEAD CT:  Mild age-related changes as above with no acute intracranial abnormality.     HEAD CTA:  1.  Moderate to severe narrowing in the distal right P2 segment with mild narrowing in the right P1/P2 junction.  2.  Moderate to severe narrowing in the proximal left V4 segment.  3.  No aneurysm and no high flow vascular malformation.     NECK CTA:  1.  No definite high-grade stenosis of the bilateral common or internal carotid arteries. However, motion markedly limits evaluation of the left carotid bifurcation and proximal left internal carotid artery.  2.  No high-grade vertebral artery stenosis.  3.  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    MRI Brain:  1.  Small punctate foci of acute to subacute infarction in the left occipital lobe.  2.  No superimposed acute intracranial process or abnormal enhancement.  3.  Mild age-related changes as above    Impression  Acute ischemic stroke of L occipital lobe due to other etiology (ESUS vs vasculitis)  Degree of aphasia/encephalopathy described is not explained by MRI findings and further workup is required including LP, EEG, consider DSA to evaluate for potential CNS vasculitis or other causes of headache + encephalopathy + punctate embolic appearing strokes    Recommendations  -Transfer to [facility] or Critical access hospital for further stroke/neuro workup  - Use orderset: \"Ischemic " "Stroke Routine Admission\" or \"Ischemic Stroke No Thrombolytics/No Thrombectomy ICU Admission\"  - Place Neurology IP Stroke Consult order   - Neurochecks and Vital Signs every 4h   - Permissive HTN; goal SBP < 220 mmHg  - Daily aspirin 81 mg for secondary stroke prevention  - Statin: pending LDL  - TTE (with Bubble Study if age 60 yrs or less)  - Telemetry, EKG  - Bedside Glucose Monitoring  - A1c, Lipid Panel, Troponin x 3  - PT/OT/SLP  - Stroke Education  - Euthermia, Euglycemia  -LP with basic studies, HSV & VZV PCRs; if enough CSF send [place] [id number] miscellaneous test  -Ok to empirically cover with acyclovir until HSV/VZV back negative    My recommendations are based on the information provided over the phone by Duke Barron's in-person providers. They are not intended to replace the clinical judgment of his in-person providers. I was not requested to personally see or examine the patient at this time.     Katie Patton MD  Vascular Neurology    To page me or covering stroke neurology team member, click here: AMCOM  Choose \"On Call\" tab at top, then select \"NEUROLOGY/ALL SITES\" from middle drop-down box, press Enter, then look for \"stroke\" or \"telestroke\" for your site.         "

## 2025-03-31 NOTE — ED NOTES
Huddled in room with patient, wife Molly, and Dr. Burger. Patient and wife state understanding of risks with declining inpatient followup and state prefer to be discharged from hospital and follow up outpatient. Agreeable to having us reach out to neuro to facilitate appointment. Also agreeable to echo prior to discharge.

## 2025-03-31 NOTE — PHARMACY-ADMISSION MEDICATION HISTORY
"Pharmacist Admission Medication History    Admission medication history is complete. The information provided in this note is only as accurate as the sources available at the time of the update.    Information Source(s): Family member, Prescription bottles, and CareEverywhere/SureScripts via in-person    Pertinent Information: Discussed with wife present in room while patient away in imaging. Offered to return to complete med rec once patient is back and wife states \"he wouldn't be able to tell you now\" given AMS on presentation.     Unable to utilize SureScripts for fill history, however wife conveniently provided medication bottles. States that patient manages on his own at home and took his medications around 1730 today and this likely was his only time taking medications today. Questionable if patient took evening meds last night given illness.     Wife states that patient uses an inhaler but she is unsure what it is called. Utilizing [facility] medication list (patient's primary care team), both albuterol and Symbicort listed from 09/2024. Left both on list, unsure how often patient uses. Both tadalafil and vardenafil Rx bottles brought in, only vardenafil on [facility] list - only left vardenafil on list below.    Changes made to PTA medication list:  Added: fish oil, metformin, vardenafil, Symbicort  Deleted: None  Changed: None    Allergies reviewed with patient and updates made in EHR: yes    Medication History Completed By: Sonia Thakkar, PharmD 3/30/2025 9:27 PM    PTA Med List   Medication Sig Last Dose/Taking    budesonide-formoterol (SYMBICORT/BREYNA) 80-4.5 MCG/ACT Inhaler Inhale 1-2 puffs into the lungs 4 times daily as needed. Unknown    fish oil-omega-3 fatty acids 1000 MG capsule Take 1 g by mouth 2 times daily. 3/30/2025 at  5:30 PM    metFORMIN (GLUCOPHAGE) 1000 MG tablet Take 1,000 mg by mouth 2 times daily (with meals). 3/30/2025 at  5:30 PM    metoprolol succinate ER (TOPROL XL) 100 " MG 24 hr tablet Take 100 mg by mouth 2 times daily. 3/30/2025 at  5:30 PM    vardenafil (LEVITRA) 20 MG tablet Take 20 mg by mouth daily as needed (ED). Unknown    amLODIPine (NORVASC) 10 MG tablet Take 10 mg by mouth daily. 3/30/2025 at  5:30 PM    aspirin (ASA) 81 MG chewable tablet Take 81 mg by mouth daily. 3/30/2025 at  5:30 PM    atorvastatin (LIPITOR) 20 MG tablet Take 20 mg by mouth daily. 3/30/2025 at  5:30 PM    lisinopril (ZESTRIL) 40 MG tablet Take 40 mg by mouth daily. 3/30/2025 at  5:30 PM    multivitamin (MULTIVITAMIN) per tablet Take 1 tablet by mouth daily. 3/30/2025 at  5:30 PM    nitroglycerin (NITROSTAT) 0.4 MG SL tablet [NITROGLYCERIN (NITROSTAT) 0.4 MG SL TABLET] Place 0.4 mg under the tongue every 5 (five) minutes as needed for chest pain. PLACE 1 TABLET UNDER THE TONGUE EVERY 5 MINUTES UP TO 3 DOSES AS NEEDED FOR CHEST PAIN. Taking As Needed    VENTOLIN HFA 90 mcg/actuation inhaler Inhale 1-2 puffs into the lungs every 6 hours as needed. Unknown

## 2025-03-31 NOTE — ED NOTES
Pt woke from sleep, alert and oriented x4. Pt aware that he is now able to answer questions appropriately.  Asked if I should update his wife and pt responded yes.  Attempted to call wife, Molly, at [phone], with no answer at this time. Will attempt again prior to shift change.

## 2025-03-31 NOTE — ED NOTES
EMERGENCY DEPARTMENT SIGN OUT NOTE        ED COURSE AND MEDICAL DECISION MAKING  Patient was signed out to me by Dr Patricia Gross at 12:04 AM     In brief, Duke Barron is a 64 year old male who initially presented headache and word finding difficulty.  Started 24 hours ago.  No fever.  Had negative head CT.  Had a MRI that shows an occipital stroke does not quite fit with symptomatology.  Discussed with stroke.  LP done.  Acyclovir given at this point for possible HSV encephalitis.  No antibiotics otherwise.  No steroids at this time per neuro.    At time of sign out, disposition was pending transfer to stroke center with interventional neurology    Patient signed out to Dr. Burger pending transfer.    FINAL IMPRESSION    1. Acute ischemic stroke (H)    2. Acute encephalopathy        ED MEDS  Medications   acyclovir (ZOVIRAX) 750 mg in sodium chloride 0.9 % 265 mL intermittent infusion (has no administration in time range)   aspirin (ASA) chewable tablet 81 mg (has no administration in time range)   atorvastatin (LIPITOR) tablet 40 mg (has no administration in time range)   fluticasone-vilanterol (BREO ELLIPTA) 100-25 MCG/ACT inhaler 1 puff (has no administration in time range)   metFORMIN (GLUCOPHAGE) tablet 1,000 mg (has no administration in time range)   nitroGLYcerin (NITROSTAT) sublingual tablet 0.4 mg (has no administration in time range)   albuterol (PROVENTIL HFA/VENTOLIN HFA) inhaler (has no administration in time range)   glucose gel 15-30 g (has no administration in time range)     Or   dextrose 50 % injection 25-50 mL (has no administration in time range)     Or   glucagon injection 1 mg (has no administration in time range)   insulin aspart (NovoLOG) injection (RAPID ACTING) (has no administration in time range)   iopamidol (ISOVUE-370) solution 75 mL (75 mLs Intravenous $Given 3/30/25 1925)   gadobutrol (GADAVIST) injection 7.5 mL (7.5 mLs Intravenous $Given 3/30/25 2132)   LORazepam (ATIVAN)  injection 1 mg (1 mg Intravenous $Given 3/30/25 6574)       LAB  Labs Ordered and Resulted from Time of ED Arrival to Time of ED Departure   BASIC METABOLIC PANEL - Abnormal       Result Value    Sodium 136      Potassium 3.7      Chloride 99      Carbon Dioxide (CO2) 26      Anion Gap 11      Urea Nitrogen 10.1      Creatinine 0.93      GFR Estimate >90      Calcium 9.4      Glucose 137 (*)    GLUCOSE BY METER - Abnormal    GLUCOSE BY METER POCT 127 (*)    CBC WITH PLATELETS AND DIFFERENTIAL - Abnormal    WBC Count 12.9 (*)     RBC Count 4.64      Hemoglobin 13.8      Hematocrit 39.7 (*)     MCV 86      MCH 29.7      MCHC 34.8      RDW 11.8      Platelet Count 323      % Neutrophils 71      % Lymphocytes 19      % Monocytes 9      % Eosinophils 0      % Basophils 0      % Immature Granulocytes 0      NRBCs per 100 WBC 0      Absolute Neutrophils 9.2 (*)     Absolute Lymphocytes 2.5      Absolute Monocytes 1.1      Absolute Eosinophils 0.0      Absolute Basophils 0.1      Absolute Immature Granulocytes 0.0      Absolute NRBCs 0.0     CRP INFLAMMATION - Abnormal    CRP Inflammation 7.31 (*)    ERYTHROCYTE SEDIMENTATION RATE AUTO - Abnormal    Erythrocyte Sedimentation Rate 24 (*)    GLUCOSE CSF - Abnormal    Glucose CSF 71 (*)    PROTEIN TOTAL CSF - Abnormal    Protein total CSF 50.2 (*)    INR - Normal    INR 0.97     PARTIAL THROMBOPLASTIN TIME - Normal    aPTT 27     ISTAT CREATININE POCT - Normal    Creatinine POCT 1.0      GFR, ESTIMATED POCT >60     INFLUENZA A/B, RSV AND SARS-COV2 PCR - Normal    Influenza A PCR Negative      Influenza B PCR Negative      RSV PCR Negative      SARS CoV2 PCR Negative     HEPATIC FUNCTION PANEL - Normal    Protein Total 7.1      Albumin 3.9      Bilirubin Total 0.4      Alkaline Phosphatase 74      AST 18      ALT 14      Bilirubin Direct 0.19     GLUCOSE MONITOR NURSING POCT   ENCEPHALOPATHY, AUTOIMMUNE EVALUATION CASCADE, SPINAL FLUID, ADULT   CELL COUNT CSF   GLUCOSE MONITOR  NURSING POCT   HEMOGLOBIN A1C   LIPID REFLEX TO DIRECT LDL PANEL   ETHYL ALCOHOL LEVEL   ISTAT CREATININE POCT   AEROBIC BACTERIAL CULTURE ROUTINE   MENINGITIS/ENCEPHALITIS PANEL QUAL PCR CSF   HERPES SIMPLEX VIRUS 1&2 PCR   VARICELLA ZOSTER VIRUS BY PCR   CELL COUNT WITH DIFFERENTIAL CSF       EKG      RADIOLOGY    MR Brain w/o & w Contrast   Final Result   IMPRESSION:   1.  Small punctate foci of acute to subacute infarction in the left occipital lobe.   2.  No superimposed acute intracranial process or abnormal enhancement.   3.  Mild age-related changes as above.         CTA Head Neck with Contrast   Final Result   IMPRESSION:    HEAD CT:   Mild age-related changes as above with no acute intracranial abnormality.      HEAD CTA:   1.  Moderate to severe narrowing in the distal right P2 segment with mild narrowing in the right P1/P2 junction.   2.  Moderate to severe narrowing in the proximal left V4 segment.   3.  No aneurysm and no high flow vascular malformation.      NECK CTA:   1.  No definite high-grade stenosis of the bilateral common or internal carotid arteries. However, motion markedly limits evaluation of the left carotid bifurcation and proximal left internal carotid artery.   2.  No high-grade vertebral artery stenosis.   3.  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             DISCHARGE MEDS  New Prescriptions    No medications on file       Trevor Lockhart MD  [facility] EMERGENCY ROOM  [address] [address] [phone]         Trevor Lockhart MD  03/31/25 1260

## 2025-03-31 NOTE — ED NOTES
Received phone call from wife Molly who states she no longer wants patient to be admitted to inpatient and would like to do patient's neuro work up outpatient. In large related to their insurance where inpatient workup would be very expensive but very willing to do outpatient workup with PCP and neurologist.

## 2025-03-31 NOTE — ED PROVIDER NOTES
This is a patient that was signed out to me by the overnight doctor, Dr. Oropeza.  At that point he had extensive neurowork-up and was planned to transfer to [facility] for additional stroke type workup.      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    I had a long discussion with them.  He is awake, alert, neurologically intact, he has autonomy and decision making capacity.  Wife states that he is at his neurobaseline and I do not  any signs of confusion.    I discussed all of the risks of possible further neurologic damage waiting for outpatient workup that possibly could be avoidable if he pursues inpatient workup as planned.      He understands all of these risks, understands that they apply to him, is willing to take these risks at this time.    He was amenable to going for the echo study which I have ordered to expedite here.    I did reach out to stroke neurology and they will try to have a televisit with him but did not recommend any additional pharmacotherapy at this point.  They are hoping that at least the HSV panel can come back so that possibly he can be taken off the acyclovir if he pursues outpatient workup and they did indicate that they would not be able to see him very rapidly.      This has been conveyed to the patient and wife as well.        I did try to arrange a televisit with neurology to even discuss what would be the next steps if he goes AMA as an outpatient but he would like to leave before this could be set up.    I did explain that he is welcome to return at any time to pursue the workup as planned and recommend that he does so as soon as possible but he remains resolute to leave AGAINST MEDICAL ADVICE and we will have him sign the form  and he will be discharged at this time.               Long Burger MD  03/31/25 1039

## 2025-03-31 NOTE — PROGRESS NOTES
[facility]    PROGRESS NOTE - Hospitalist Service    ASSESSMENT AND PLAN     Active Problems:    Dyslipidemia, goal LDL below 70    Essential hypertension    Coronary Artery Disease    Type 2 diabetes mellitus with diabetic microalbuminuria, with long-term current use of insulin (H)    PVD (peripheral vascular disease)    Acute ischemic stroke (H)    Acute encephalopathy    Duke Barron is a 64 year old male admitted on 3/30/2025. He has medical history significant for CAD, hypertension, hyperlipidemia, peripheral vascular disease, type 2 diabetes mellitus, and chronic bronchitis.  He presented to the ED for focal neurologic deficit and was found to have acute CVA.  Pending transfer for higher level of care..  Hospitalist service is consulted for medical management.     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  Headache and confusion   CT brain was unremarkable.  MRI showed acute to subacute stroke in the left occipital region.  Neurology recommendations appreciated.  Pending transfer.  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     Moderate to severe narrowing in the distal right P2 segment  Moderate to severe narrowing in the proximal left V4 segment.  Pending transfer to higher level of care- patient and wife decline transfer and want to discharge to home with follow up outpatient   Increase Lipitor to 40 mg at bedtime  Continue aspirin     Type 2 diabetes mellitus  Continue metformin  Correctional scale insulin  Hypoglycemia protocol    Spoke with patient and wife at the bedside.  They do not seem to understand how severe this infection if patient has HSV encephalitis despite describing  to them the need to stay in the hospital and receive IV acyclovir.  They seem far more concerned about cost and states they will follow-up with their physicians at [facility].      Medically Ready for Discharge: Anticipated in 2-4 Days    Clinically Significant Risk Factors Present on Admission                 # Drug Induced Platelet Defect: home medication list includes an antiplatelet medication   # Hypertension: Noted on problem list          # DMII: A1C = 7.7 % (Ref range: <5.7 %) within past 6 months                   Subjective:  Patient in bed.  Answering questions.  States his speech is much improved.  Wife at bedside.    PHYSICAL EXAM  Temp:  [98.5  F (36.9  C)] 98.5  F (36.9  C)  Pulse:  [59-80] 76  Resp:  [15-30] 18  BP: (127-183)/(60-86) 163/72  SpO2:  [91 %-97 %] 96 %  Wt Readings from Last 1 Encounters:   03/30/25 73.5 kg (162 lb)     No intake or output data in the 24 hours ending 03/31/25 1128   Body mass index is 24.63 kg/m .    GENRL: Alert and answering questions. Not in acute distress. Lying in bed   CHEST: Breathing easily   EXTRM: No pedal edema  NEURO: No focal neurological deficit. No involuntary movements. Normal mentation  PSYCH: Normal affect and mood.   INTGM: No skin rash    Medical Decision Making       35 MINUTES SPENT BY ME on the date of service doing chart review, history, exam, documentation & further activities per the note.      PERTINENT LABS/IMAGING:  Results for orders placed or performed during the hospital encounter of 03/30/25   CTA Head Neck with Contrast    Impression    IMPRESSION:   HEAD CT:  Mild age-related changes as above with no acute intracranial abnormality.    HEAD CTA:  1.  Moderate to severe narrowing in the distal right P2 segment with mild narrowing in the right P1/P2 junction.  2.  Moderate to severe narrowing in the proximal left V4 segment.  3.  No aneurysm and no high flow vascular malformation.    NECK CTA:  1.  No definite high-grade stenosis of  the bilateral common or internal carotid arteries. However, motion markedly limits evaluation of the left carotid bifurcation and proximal left internal carotid artery.  2.  No high-grade vertebral artery stenosis.  3.  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     MR Brain w/o & w Contrast    Impression    IMPRESSION:  1.  Small punctate foci of acute to subacute infarction in the left occipital lobe.  2.  No superimposed acute intracranial process or abnormal enhancement.  3.  Mild age-related changes as above.     Echocardiogram Complete   Result Value Ref Range    LVEF  60-65%      Most Recent 3 CBC's:  Recent Labs   Lab Test 03/30/25 1917   WBC 12.9*   HGB 13.8   MCV 86        Most Recent 3 BMP's:  Recent Labs   Lab Test 03/31/25 0817 03/31/25 0357 03/31/25 0024 03/30/25 1926 03/30/25 1917   NA  --   --   --   --  136   POTASSIUM  --   --   --   --  3.7   CHLORIDE  --   --   --   --  99   CO2  --   --   --   --  26   BUN  --   --   --   --  10.1   CR  --   --   --  1.0 0.93   ANIONGAP  --   --   --   --  11   MINISTERIO  --   --   --   --  9.4   * 104* 119*  --  137*     Most Recent 2 LFT's:  Recent Labs   Lab Test 03/30/25 1917   AST 18   ALT 14   ALKPHOS 74   BILITOTAL 0.4       Recent Labs   Lab Test 03/30/25 1917   CHOL 176   HDL 42   LDL 91   TRIG 217*     Recent Labs   Lab Test 03/30/25 1917   LDL 91     Recent Labs   Lab Test 03/31/25 0817 03/31/25 0024 03/30/25 1926 03/30/25 1917   NA  --   --   --  136   POTASSIUM  --   --   --  3.7   CHLORIDE  --   --   --  99   CO2  --   --   --  26   *   < >  --  137*   BUN  --   --   --  10.1   CR  --   --  1.0 0.93   GFRESTIMATED  --   --  >60 >90   MINISTERIO  --   --   --  9.4    < > = values in this interval not displayed.     Recent Labs   Lab Test 03/30/25  1917   A1C 7.7*     Recent Labs   Lab Test 03/30/25 1917   HGB 13.8     No results for input(s):  "\"TROPONINI\" in the last 67073 hours.  No results for input(s): \"BNP\", \"NTBNPI\", \"NTBNP\" in the last 81454 hours.  No results for input(s): \"TSH\" in the last 37224 hours.  Recent Labs   Lab Test 03/30/25 1917   INR 0.97       Raiza Alvarado, DO  Hospitalist Service  [facility]      "

## 2025-04-03 LAB
BACTERIA CSF CULT: NO GROWTH
GRAM STAIN RESULT: NORMAL
GRAM STAIN RESULT: NORMAL

## 2025-04-05 LAB
BACTERIA CSF CULT: NO GROWTH
GRAM STAIN RESULT: NORMAL
GRAM STAIN RESULT: NORMAL

## 2025-04-06 ENCOUNTER — HEALTH MAINTENANCE LETTER (OUTPATIENT)
Age: 65
End: 2025-04-06

## 2025-04-08 LAB
AMPA R AB CBA CSF: NEGATIVE
AMPHIPHYSIN AB TITR CSF IF: NEGATIVE {TITER}
ANNOTATION COMMENT IMP: NORMAL
CASPR2-IGG CBA, CSF: NEGATIVE
CV2 IGG TITR CSF: NEGATIVE {TITER}
DPPX IGG CSF QL CBA IFA: NEGATIVE
GABABR IGG CSF QL CBA IFA: NEGATIVE
GAD65 AB CSF-SCNC: 0 NMOL/L
GFAP IFA, CSF: NEGATIVE
GLIAL NUC TYPE 1 AB TITR CSF: NEGATIVE {TITER}
HU1 AB TITR CSF IF: NEGATIVE {TITER}
HU2 AB TITR CSF IF: NEGATIVE {TITER}
HU3 AB TITR CSF IF: NEGATIVE {TITER}
IGLON5 IGG CSF QL CBA IFA: NEGATIVE
IMMUNOLOGIST REVIEW: NEGATIVE
IMMUNOLOGIST REVIEW: NORMAL
LGI1-IGG CBA, CSF: NEGATIVE
MGLUR1 AB IFA, CSF: NEGATIVE
NEUROCHONDRIN IFA, CSF: NEGATIVE
NMDAR1 IGG CSF QL CBA IFA: NEGATIVE
PCA-1 AB TITR CSF: NEGATIVE {TITER}
PCA-2 AB TITR CSF: NEGATIVE {TITER}
PCA-TR AB TITR CSF IF: NEGATIVE {TITER}
PDE10A AB SPEC QL IF: NEGATIVE
SEPTIN-7 IFA, CSF: NEGATIVE
TRIM46 SPEC QL IF: NEGATIVE

## 2025-04-27 ENCOUNTER — HEALTH MAINTENANCE LETTER (OUTPATIENT)
Age: 65
End: 2025-04-27

## 2025-07-20 ENCOUNTER — HEALTH MAINTENANCE LETTER (OUTPATIENT)
Age: 65
End: 2025-07-20

## 2025-08-10 ENCOUNTER — HEALTH MAINTENANCE LETTER (OUTPATIENT)
Age: 65
End: 2025-08-10